# Patient Record
Sex: FEMALE | Race: WHITE | HISPANIC OR LATINO
[De-identification: names, ages, dates, MRNs, and addresses within clinical notes are randomized per-mention and may not be internally consistent; named-entity substitution may affect disease eponyms.]

---
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
## 2023-04-24 NOTE — REVIEW OF SYSTEMS
[FreeTextEntry1] : 1.  Chronic constipation, likely functional in etiology.  Colonoscopy in September 2020 with tubular adenoma, diverticulosis.  Prior anorectal manometry, anal sphincter EMG with Dr. Bin Witt unremarkable.  MR Defecography (2014) with cystocele and partial tear of levator ani muscle.\par 2.  Epigastric pain.  EGD in September 2020 with gastritis.  US in 2021 with gallstones.\par 3.  Asthma.\par 4.  Chronic urticaria.\par 5.  HTN.\par 6.  Hypothyroidism.\par 7.  Osteopenia.\par 8.  Status post JACK-BSO.\par \par Recs:\par - Recent labs reviewed.\par - Restart famotidine at bedtime.\par - Patient was advised to use Prune-Lax as needed.  Lactulose prescribed but patient advised of potential bloating.  Linzess 72 mcg samples also given (can try 1-2 tablets PRN).  Patient is currently hesitant to use laxatives frequently due to work reasons.\par - Repeat colonoscopy in 2025.\par - Check abdominal XRay. [Joint Pain] : joint pain [Negative] : Heme/Lymph

## 2023-10-01 PROBLEM — Z92.3 HISTORY OF RADIATION THERAPY: Status: RESOLVED | Noted: 2022-10-13 | Resolved: 2023-10-01
